# Patient Record
Sex: FEMALE | Race: WHITE | URBAN - METROPOLITAN AREA
[De-identification: names, ages, dates, MRNs, and addresses within clinical notes are randomized per-mention and may not be internally consistent; named-entity substitution may affect disease eponyms.]

---

## 2022-06-23 VITALS — HEIGHT: 65 IN

## 2022-06-28 ENCOUNTER — HOSPITAL ENCOUNTER (OUTPATIENT)
Age: 57
Setting detail: SPECIMEN
Discharge: HOME | End: 2022-06-28

## 2022-06-28 ENCOUNTER — OFFICE VISIT (OUTPATIENT)
Dept: OBGYN CLINIC | Age: 57
End: 2022-06-28
Payer: COMMERCIAL

## 2022-06-28 VITALS — BODY MASS INDEX: 25.63 KG/M2 | WEIGHT: 154 LBS | SYSTOLIC BLOOD PRESSURE: 120 MMHG | DIASTOLIC BLOOD PRESSURE: 74 MMHG

## 2022-06-28 DIAGNOSIS — Z12.31 ENCOUNTER FOR SCREENING MAMMOGRAM FOR MALIGNANT NEOPLASM OF BREAST: ICD-10-CM

## 2022-06-28 DIAGNOSIS — Z01.419 VISIT FOR GYNECOLOGIC EXAMINATION: Primary | ICD-10-CM

## 2022-06-28 PROCEDURE — 99396 PREV VISIT EST AGE 40-64: CPT

## 2022-06-28 RX ORDER — CHOLECALCIFEROL (VITAMIN D3) 1250 MCG
CAPSULE ORAL
COMMUNITY

## 2022-06-28 ASSESSMENT — ENCOUNTER SYMPTOMS
SHORTNESS OF BREATH: 0
ABDOMINAL PAIN: 0
CONSTIPATION: 0
DIARRHEA: 0

## 2022-06-28 NOTE — PROGRESS NOTES
YEARLY PHYSICAL    Date of service: 2022    Martha Hernandez  Is a 64 y.o.   female    PT's PCP is: No primary care provider on file. : 1965                         Subjective:       No LMP recorded. Are your menses regular: not applicable    OB History    Para Term  AB Living   1 1 1     1   SAB IAB Ectopic Molar Multiple Live Births             1      # Outcome Date GA Lbr [place]/2nd Weight Sex Delivery Anes PTL Lv   1 Term 98    M Vag-Forceps EPI  ASAEL        Social History     Tobacco Use   Smoking Status Former Smoker    Types: Cigarettes   Smokeless Tobacco Never Used        Social History     Substance and Sexual Activity   Alcohol Use Yes       Family History   Problem Relation Age of Onset    Lung Cancer Paternal Grandmother     Hypertension Maternal Grandmother     Prostate Cancer Maternal Grandfather     Cancer Father         skin    Cancer Mother         skin    Hypertension Mother     Cancer Brother         skin    Hypertension Brother     Breast Cancer Maternal Aunt        Any family history of breast or ovarian cancer:  Yes    Any family history of blood clots: No    Allergies: Ciprofloxacin and Macrobid [nitrofurantoin]      Current Outpatient Medications:     Multiple Vitamin (MULTI-VITAMIN DAILY PO), Take by mouth, Disp: , Rfl:     Cholecalciferol (VITAMIN D3) 1.25 MG (22966 UT) CAPS, Take by mouth, Disp: , Rfl:     Social History     Substance and Sexual Activity   Sexual Activity Not on file       Any bleeding or pain with intercourse: Yes    Last Yearly:  2020    Last pap: 2020 normal    Last HPV: 2020 negative    Last Mammogram:2019    Last Dexascan n/a    Last colorectal screen- type: colonoscopy date  2019    Do you do self breast exams: Yes    Past Medical History:   Diagnosis Date    Hyperlipidemia        Past Surgical History:   Procedure Laterality Date    HERNIA REPAIR         Family History   Problem Relation Age of Onset    Lung Cancer Paternal Grandmother     Hypertension Maternal Grandmother     Prostate Cancer Maternal Grandfather     Cancer Father         skin    Cancer Mother         skin    Hypertension Mother     Cancer Brother         skin    Hypertension Brother     Breast Cancer Maternal Aunt        Chief Complaint   Patient presents with    Annual Exam     Here for an annual appt, saw someone last year and thinks they may have done a pap last year, received a voicemail from PCP that they have no record of a pap in the last 5 years    Bladder Problem     Thinks her bladder is falling, will have occ DALILA symptoms, urgency, sees urology and thought possibly a UTI and never developed any different symptoms          PE:  Vital Signs  Blood pressure 120/74, weight 154 lb (69.9 kg). Labs:    No results found for this visit on 06/28/22. NURSE: Mikaela Hyde RN    HPI: Patient presents for annual visit. Denies breast concerns. Last mammogram 2019 - will order one for patient. Encouraged SBE. Denies bowel concerns. Reports that she is not leaking urine but feels sensation of increased pressure and urgency to urinate with certain movements. Feels like this has worsened over last couple of months. Made aware on exam that uterine prolapse and cystocele have continued/worsened based on notes from last exam.  Patient states that she sometimes feels anterior wall/uterus at opening of vagina. Both protrude to introitus with bearing down. Discussed management options including supracervical hyst w/ RASC vs vaginal repair. Patient wishes to proceed with intervention now before she starts having urinary leakage. She will return for surgical consult with Dr. Flaca Mcnamara. Subjective:  Review of Systems   Constitutional: Negative for chills, fatigue and fever. Respiratory: Negative for shortness of breath. Cardiovascular: Negative for chest pain.    Gastrointestinal: Negative for abdominal pain, constipation and diarrhea. Genitourinary: Positive for dyspareunia and urgency (urgency/pressure, no leakage). Negative for dysuria, frequency, menstrual problem, pelvic pain, vaginal bleeding and vaginal discharge. Neurological: Negative for dizziness, light-headedness and headaches. Objective  Physical Exam  Constitutional:       Appearance: Normal appearance. She is well-developed. HENT:      Head: Normocephalic and atraumatic. Mouth/Throat:      Mouth: Mucous membranes are moist.   Eyes:      Extraocular Movements: Extraocular movements intact. Cardiovascular:      Rate and Rhythm: Normal rate. Heart sounds: Normal heart sounds. Pulmonary:      Effort: Pulmonary effort is normal.      Breath sounds: Normal breath sounds. Chest:   Breasts: Breasts are symmetrical.      Right: No inverted nipple, mass, nipple discharge, skin change or tenderness. Left: No inverted nipple, mass, nipple discharge, skin change or tenderness. Abdominal:      General: Abdomen is flat. There is no distension. Palpations: Abdomen is soft. There is no mass. Tenderness: There is no abdominal tenderness. There is no guarding or rebound. Genitourinary:     General: Normal vulva. Labia:         Right: No rash, tenderness or lesion. Left: No rash, tenderness or lesion. Vagina: Prolapsed vaginal walls (cystocele, rectocele) present. No vaginal discharge or tenderness. Cervix: Normal.      Uterus: With uterine prolapse. Adnexa:         Right: No tenderness or fullness. Left: No tenderness or fullness. Musculoskeletal:         General: Normal range of motion. Cervical back: Normal range of motion and neck supple. Skin:     General: Skin is warm and dry. Neurological:      General: No focal deficit present. Mental Status: She is alert and oriented to person, place, and time.    Psychiatric:         Behavior: Behavior normal.         Thought Content:

## 2022-06-30 LAB
HPV SAMPLE: NORMAL
HPV, GENOTYPE 16: NOT DETECTED
HPV, GENOTYPE 18: NOT DETECTED
HPV, HIGH RISK OTHER: NOT DETECTED
HPV, INTERPRETATION: NORMAL
SPECIMEN DESCRIPTION: NORMAL

## 2022-07-11 LAB — CYTOLOGY REPORT: NORMAL

## 2022-07-28 ENCOUNTER — OFFICE VISIT (OUTPATIENT)
Dept: OBGYN CLINIC | Age: 57
End: 2022-07-28
Payer: COMMERCIAL

## 2022-07-28 VITALS
WEIGHT: 156 LBS | SYSTOLIC BLOOD PRESSURE: 110 MMHG | BODY MASS INDEX: 25.99 KG/M2 | HEIGHT: 65 IN | DIASTOLIC BLOOD PRESSURE: 70 MMHG

## 2022-07-28 DIAGNOSIS — N81.4 UTEROVAGINAL PROLAPSE: Primary | ICD-10-CM

## 2022-07-28 PROCEDURE — 99213 OFFICE O/P EST LOW 20 MIN: CPT | Performed by: OBSTETRICS & GYNECOLOGY

## 2022-07-28 NOTE — PROGRESS NOTES
DATE OF VISIT:  7/28/22    PATIENT NAME:  Ceci Rutherford     YOB: 1965    REASON FOR VISIT:    Chief Complaint   Patient presents with    Consultation     Pt would like to discuss surgery for prolapse. HISTORY OF PRESENT ILLNESS:  Pt with POP and the beginning of urinary symptoms; has some urgency and some DALILA; interested in surgical intervention; disc'd pelvic organ prolapse inc apical, anterior or posterior and related symptoms; disc'd that incontinence is not always a result of prolapse or improved with treating prolapse depending on the type of incontinence      No LMP recorded. Patient is postmenopausal.  Vitals:    07/28/22 1331   BP: 110/70   Site: Right Upper Arm   Position: Sitting   Weight: 156 lb (70.8 kg)   Height: 5' 5\" (1.651 m)     Body mass index is 25.96 kg/m². Allergies   Allergen Reactions    Ciprofloxacin     Macrobid [Nitrofurantoin]      Current Outpatient Medications   Medication Sig Dispense Refill    Multiple Vitamin (MULTI-VITAMIN DAILY PO) Take by mouth      Cholecalciferol (VITAMIN D3) 1.25 MG (00275 UT) CAPS Take by mouth       No current facility-administered medications for this visit. Social History     Socioeconomic History    Marital status:      Spouse name: None    Number of children: None    Years of education: None    Highest education level: None   Tobacco Use    Smoking status: Former     Types: Cigarettes    Smokeless tobacco: Never   Vaping Use    Vaping Use: Never used   Substance and Sexual Activity    Alcohol use: Yes    Drug use: Never       REVIEW OF SYSTEMS:  Review of Systems   Constitutional:  Negative for chills and fever. Genitourinary:  Positive for dyspareunia, urgency and vaginal pain (prolapse). Negative for dysuria and pelvic pain.      PHYSICAL EXAM:  /70 (Site: Right Upper Arm, Position: Sitting)   Ht 5' 5\" (1.651 m)   Wt 156 lb (70.8 kg)   BMI 25.96 kg/m²   Physical Exam  Constitutional:       Appearance: Normal appearance. Genitourinary:      Vulva normal.      Anterior, posterior and apical vaginal prolapse present. Mild vaginal atrophy present. No cervical discharge or lesion. Uterus is not enlarged. HENT:      Head: Normocephalic and atraumatic. Eyes:      Extraocular Movements: Extraocular movements intact. Pupils: Pupils are equal, round, and reactive to light. Cardiovascular:      Rate and Rhythm: Normal rate. Pulmonary:      Effort: Pulmonary effort is normal.   Musculoskeletal:         General: Normal range of motion. Cervical back: Normal range of motion. Neurological:      Mental Status: She is alert and oriented to person, place, and time. Skin:     General: Skin is warm and dry. Psychiatric:         Mood and Affect: Mood normal.         Behavior: Behavior normal.         Thought Content: Thought content normal.     The patient, Mina White is a 64 y.o. female, was seen with a total time spent of 20 minutes for the visit on this date of service by the E/M provider. The time component had both face to face and non face to face time spent in determining the total time component. Counseling and education regarding her diagnosis listed below and her options regarding those diagnoses were also included in determining her time component. Diagnosis Orders   1. Uterovaginal prolapse             The patient had her preventative health maintenance recommendations and follow-up reviewed with her at the completion of her visit. ASSESSMENT:      1. Uterovaginal prolapse        PLAN:  No orders of the defined types were placed in this encounter. Return for  to coordinate.        Electronically signed by Enrrique Waters DO on 08/28/22

## 2022-08-09 ENCOUNTER — TELEPHONE (OUTPATIENT)
Dept: OBGYN CLINIC | Age: 57
End: 2022-08-09

## 2022-08-09 DIAGNOSIS — N81.4 UTEROVAGINAL PROLAPSE: ICD-10-CM

## 2022-08-09 DIAGNOSIS — Z01.818 PRE-OP TESTING: Primary | ICD-10-CM

## 2022-08-09 NOTE — TELEPHONE ENCOUNTER
You had sent a note that patient was considering a Posterior Repair, but had the option of a LSH/RASC. When I called patient, she seems confused about the options and which one would be a better fit for her. Dena's note says that she has uterine prolapse and cystocele. Your note is not completed, but you said she needed a Posterior Repair. She is wanting to confirm what needs to be done and hoping to get everything fixed at one time. Can you please review and give recommendations?

## 2022-09-07 NOTE — TELEPHONE ENCOUNTER
I spoke to patient and reviewed surgery expectations and recovery. She is scheduled for a RA Laparoscopic Supracervical Hysterectomy/ BSO, Robotic Abdominal Sacral Colpopexy with a Posterior Repair at [facility] on 12/19/2022. She is aware that a nurse from [facility] will call her to complete a phone interview and arrange COVID testing if needed. She has a repetitive job with lifting/pushing/pulling. She is aware that she will need to be off work for 6 weeks. Patient will let me know if she is needing a note for work or [employer] completed. Patient will come in to see Dr. Mary Benitez prior to surgery and will get labs at that visit. Patient will go to [facility] to have an EKG done prior to surgery. We will also follow up 2 and 6 weeks after surgery. Appointments scheduled. Patient verbalized understanding, no further questions/concerns voiced.

## 2022-09-22 ENCOUNTER — TELEPHONE (OUTPATIENT)
Dept: OBGYN CLINIC | Age: 57
End: 2022-09-22

## 2022-11-30 ENCOUNTER — TELEPHONE (OUTPATIENT)
Dept: OBGYN CLINIC | Age: 57
End: 2022-11-30

## 2022-11-30 NOTE — TELEPHONE ENCOUNTER
Patient called questioning how soon after surgery she can have a routine dental cleaning. She was scheduled the day after surgery and will be rescheduling, but wanted to see if there were any restrictions. Informed patient that she should give it a few days so she is a little more comfortable after surgery, but otherwise no restrictions. Patient verbalized understanding, no further questions/concerns voiced.

## 2022-12-15 ENCOUNTER — TELEPHONE (OUTPATIENT)
Dept: OBGYN CLINIC | Age: 57
End: 2022-12-15

## 2022-12-15 NOTE — TELEPHONE ENCOUNTER
Patient called on 12/14 that she was scheduled for surgery on 12/19 with Dr. Brandon Villatoro, but tested positive for COVID on 12/9. Per anesthesia, she needs to be postponed until 28 days after her positive test since she was experiencing symptoms. I had spoke to patient and we penciled her back in for 1/9/23. She had mentioned that she received a letter from her insurance that they were still in negotiations with [facility] ([facility]) about coverage and we may be out of network starting January 2023. I did confirm with Aryan Hubbard that it is strongly recommended that surgery be postponed until we confirm insurance coverage to avoid patient paying the entire bill out of pocket. I did leave all of this on patient's voicemail. Surgery and associated visits cancelled. I will let her know once I can confirm insurance coverage. She is aware that we could attempt to schedule her surgery at [facility] if [facility]ott does not get in network with [facility]. Patient to call with any further questions/concerns.

## 2023-01-18 ENCOUNTER — TELEPHONE (OUTPATIENT)
Dept: OBGYN CLINIC | Age: 58
End: 2023-01-18

## 2023-01-18 NOTE — TELEPHONE ENCOUNTER
PA initiated on [employer]'s automated system regarding patient's upcoming procedure ([address], [address] [place] [address], [facility]). Patient's procedure does not require a PA. Reference # is [id number], [id number], and [id number] respectively.

## 2023-01-24 NOTE — PROGRESS NOTES
Patient instructed per phone interview on the pre-operative, intra-operative, and post-operative process, as well as NPO status. Pre-operative instruction sheet reviewed as well as G skin prep instructions. Verbalizes understanding.

## 2023-02-01 ENCOUNTER — OFFICE VISIT (OUTPATIENT)
Dept: OBGYN CLINIC | Age: 58
End: 2023-02-01
Payer: COMMERCIAL

## 2023-02-01 ENCOUNTER — HOSPITAL ENCOUNTER (OUTPATIENT)
Age: 58
Setting detail: SPECIMEN
Discharge: HOME | End: 2023-02-01

## 2023-02-01 VITALS — WEIGHT: 163 LBS | SYSTOLIC BLOOD PRESSURE: 112 MMHG | DIASTOLIC BLOOD PRESSURE: 74 MMHG | BODY MASS INDEX: 27.12 KG/M2

## 2023-02-01 DIAGNOSIS — N81.4 UTEROVAGINAL PROLAPSE: ICD-10-CM

## 2023-02-01 DIAGNOSIS — Z01.818 PRE-OP TESTING: ICD-10-CM

## 2023-02-01 DIAGNOSIS — N81.4 UTEROVAGINAL PROLAPSE: Primary | ICD-10-CM

## 2023-02-01 LAB
ABSOLUTE EOS #: 0.16 K/UL (ref 0–0.44)
ABSOLUTE IMMATURE GRANULOCYTE: <0.03 K/UL (ref 0–0.3)
ABSOLUTE LYMPH #: 1.47 K/UL (ref 1.1–3.7)
ABSOLUTE MONO #: 0.3 K/UL (ref 0.1–1.2)
ALBUMIN SERPL-MCNC: 4.5 G/DL (ref 3.5–5.2)
ALBUMIN/GLOBULIN RATIO: 1.7 (ref 1–2.5)
ALP SERPL-CCNC: 63 U/L (ref 35–104)
ALT SERPL-CCNC: 14 U/L (ref 5–33)
ANION GAP SERPL CALCULATED.3IONS-SCNC: 10 MMOL/L (ref 9–17)
AST SERPL-CCNC: 17 U/L
BASOPHILS # BLD: 2 % (ref 0–2)
BASOPHILS ABSOLUTE: 0.08 K/UL (ref 0–0.2)
BILIRUB SERPL-MCNC: 0.3 MG/DL (ref 0.3–1.2)
BUN SERPL-MCNC: 14 MG/DL (ref 6–20)
CALCIUM SERPL-MCNC: 9.7 MG/DL (ref 8.6–10.4)
CHLORIDE SERPL-SCNC: 104 MMOL/L (ref 98–107)
CO2 SERPL-SCNC: 29 MMOL/L (ref 20–31)
CREAT SERPL-MCNC: 0.67 MG/DL (ref 0.5–0.9)
EOSINOPHILS RELATIVE PERCENT: 3 % (ref 1–4)
GFR SERPL CREATININE-BSD FRML MDRD: >60 ML/MIN/1.73M2
GLUCOSE SERPL-MCNC: 84 MG/DL (ref 70–99)
HCT VFR BLD AUTO: 43.1 % (ref 36.3–47.1)
HGB BLD-MCNC: 13.9 G/DL (ref 11.9–15.1)
IMMATURE GRANULOCYTES: 0 %
LYMPHOCYTES # BLD: 28 % (ref 24–43)
MCH RBC QN AUTO: 30.6 PG (ref 25.2–33.5)
MCHC RBC AUTO-ENTMCNC: 32.3 G/DL (ref 28.4–34.8)
MCV RBC AUTO: 94.9 FL (ref 82.6–102.9)
MONOCYTES # BLD: 6 % (ref 3–12)
NRBC AUTOMATED: 0 PER 100 WBC
PDW BLD-RTO: 13.7 % (ref 11.8–14.4)
PLATELET # BLD AUTO: 197 K/UL (ref 138–453)
PMV BLD AUTO: 11.7 FL (ref 8.1–13.5)
POTASSIUM SERPL-SCNC: 4.5 MMOL/L (ref 3.7–5.3)
PROT SERPL-MCNC: 7.2 G/DL (ref 6.4–8.3)
RBC # BLD: 4.54 M/UL (ref 3.95–5.11)
SEG NEUTROPHILS: 61 % (ref 36–65)
SEGMENTED NEUTROPHILS ABSOLUTE COUNT: 3.15 K/UL (ref 1.5–8.1)
SODIUM SERPL-SCNC: 143 MMOL/L (ref 135–144)
WBC # BLD AUTO: 5.2 K/UL (ref 3.5–11.3)

## 2023-02-01 PROCEDURE — 99213 OFFICE O/P EST LOW 20 MIN: CPT | Performed by: OBSTETRICS & GYNECOLOGY

## 2023-02-01 NOTE — PROGRESS NOTES
DATE OF VISIT:  23    PATIENT NAME:  Lesley Jackson     YOB: 1965    REASON FOR VISIT:    Chief Complaint   Patient presents with    Pre-op Exam     Patient is scheduled on 2023 for RA LSH/BSO, RASC, posterior repair. Other     Rectocele,cystocele. Has stress urinary incontinence and urgency. Patient can feel a bulge which is worse if she has done some lifting. HISTORY OF PRESENT ILLNESS:  Pt with uterovaginal prolapse and interested in surgical intervention; disc'd ra lsh/rasc and posterior repair; r/b/a disc'd; restrictions and recovery disc'd      No LMP recorded. Patient is postmenopausal.  Vitals:    23 0932   BP: 112/74   Site: Right Upper Arm   Position: Sitting   Weight: 163 lb (73.9 kg)     Body mass index is 27.12 kg/m². Allergies   Allergen Reactions    Ciprofloxacin     Macrobid [Nitrofurantoin]      Current Outpatient Medications   Medication Sig Dispense Refill    Multiple Vitamin (MULTI-VITAMIN DAILY PO) Take by mouth (Patient not taking: Reported on 2023)      Cholecalciferol (VITAMIN D3) 1.25 MG (50694 UT) CAPS Take by mouth (Patient not taking: Reported on 2023)       No current facility-administered medications for this visit. Social History     Socioeconomic History    Marital status:      Spouse name: None    Number of children: None    Years of education: None    Highest education level: None   Tobacco Use    Smoking status: Former     Types: Cigarettes     Quit date:      Years since quittin.1    Smokeless tobacco: Never   Vaping Use    Vaping Use: Never used   Substance and Sexual Activity    Alcohol use: Yes    Drug use: Never       REVIEW OF SYSTEMS:  Review of Systems   Constitutional:  Negative for chills and fever. Genitourinary:  Positive for vaginal pain (bulge). Negative for dysuria, pelvic pain, urgency, vaginal bleeding and vaginal discharge.      PHYSICAL EXAM:  /74 (Site: Right Upper Arm, Position: Sitting)   Wt 163 lb (73.9 kg)   BMI 27.12 kg/m²   Physical Exam  Constitutional:       Appearance: Normal appearance. HENT:      Head: Normocephalic and atraumatic. Eyes:      Extraocular Movements: Extraocular movements intact. Pupils: Pupils are equal, round, and reactive to light. Cardiovascular:      Rate and Rhythm: Normal rate. Pulmonary:      Effort: Pulmonary effort is normal.   Musculoskeletal:         General: Normal range of motion. Neurological:      Mental Status: She is alert and oriented to person, place, and time. Skin:     General: Skin is warm and dry. Psychiatric:         Mood and Affect: Mood normal.         Behavior: Behavior normal.   The patient, Trey Capellan is a 62 y.o. female, was seen with a total time spent of 20 minutes for the visit on this date of service by the E/M provider. The time component had both face to face and non face to face time spent in determining the total time component. Counseling and education regarding her diagnosis listed below and her options regarding those diagnoses were also included in determining her time component. Diagnosis Orders   1. Uterovaginal prolapse             The patient had her preventative health maintenance recommendations and follow-up reviewed with her at the completion of her visit. ASSESSMENT:      1. Uterovaginal prolapse        PLAN:  No orders of the defined types were placed in this encounter. Return in about 1 week (around 2/8/2023) for RA LSH/BSO; RASC/post colporrhaphy.        Electronically signed by Cesar Kamara DO on 02/01/23

## 2023-02-03 ENCOUNTER — ANESTHESIA EVENT (OUTPATIENT)
Dept: OPERATING ROOM | Age: 58
End: 2023-02-03
Payer: COMMERCIAL

## 2023-02-06 ENCOUNTER — HOSPITAL ENCOUNTER (OUTPATIENT)
Age: 58
Setting detail: OUTPATIENT SURGERY
Discharge: HOME | End: 2023-02-06
Attending: OBSTETRICS & GYNECOLOGY | Admitting: OBSTETRICS & GYNECOLOGY
Payer: COMMERCIAL

## 2023-02-06 ENCOUNTER — ANESTHESIA (OUTPATIENT)
Dept: OPERATING ROOM | Age: 58
End: 2023-02-06
Payer: COMMERCIAL

## 2023-02-06 VITALS
RESPIRATION RATE: 18 BRPM | WEIGHT: 160 LBS | HEART RATE: 60 BPM | TEMPERATURE: 97.6 F | OXYGEN SATURATION: 97 % | HEIGHT: 65 IN | SYSTOLIC BLOOD PRESSURE: 110 MMHG | BODY MASS INDEX: 26.66 KG/M2 | DIASTOLIC BLOOD PRESSURE: 55 MMHG

## 2023-02-06 DIAGNOSIS — N81.4 UTEROVAGINAL PROLAPSE: ICD-10-CM

## 2023-02-06 DIAGNOSIS — G89.18 POST-OP PAIN: Primary | ICD-10-CM

## 2023-02-06 PROCEDURE — 6360000002 HC RX W HCPCS: Performed by: NURSE ANESTHETIST, CERTIFIED REGISTERED

## 2023-02-06 PROCEDURE — S2900 ROBOTIC SURGICAL SYSTEM: HCPCS | Performed by: OBSTETRICS & GYNECOLOGY

## 2023-02-06 PROCEDURE — 58542 LSH W/T/O UT 250 G OR LESS: CPT | Performed by: OBSTETRICS & GYNECOLOGY

## 2023-02-06 PROCEDURE — 6360000002 HC RX W HCPCS

## 2023-02-06 PROCEDURE — 7100000011 HC PHASE II RECOVERY - ADDTL 15 MIN: Performed by: OBSTETRICS & GYNECOLOGY

## 2023-02-06 PROCEDURE — 2580000003 HC RX 258: Performed by: NURSE ANESTHETIST, CERTIFIED REGISTERED

## 2023-02-06 PROCEDURE — 3600000009 HC SURGERY ROBOT BASE: Performed by: OBSTETRICS & GYNECOLOGY

## 2023-02-06 PROCEDURE — A4216 STERILE WATER/SALINE, 10 ML: HCPCS | Performed by: NURSE ANESTHETIST, CERTIFIED REGISTERED

## 2023-02-06 PROCEDURE — 3700000000 HC ANESTHESIA ATTENDED CARE: Performed by: OBSTETRICS & GYNECOLOGY

## 2023-02-06 PROCEDURE — 64488 TAP BLOCK BI INJECTION: CPT | Performed by: NURSE ANESTHETIST, CERTIFIED REGISTERED

## 2023-02-06 PROCEDURE — 7100000000 HC PACU RECOVERY - FIRST 15 MIN: Performed by: OBSTETRICS & GYNECOLOGY

## 2023-02-06 PROCEDURE — 3600000019 HC SURGERY ROBOT ADDTL 15MIN: Performed by: OBSTETRICS & GYNECOLOGY

## 2023-02-06 PROCEDURE — 88305 TISSUE EXAM BY PATHOLOGIST: CPT

## 2023-02-06 PROCEDURE — 2500000003 HC RX 250 WO HCPCS: Performed by: OBSTETRICS & GYNECOLOGY

## 2023-02-06 PROCEDURE — 57425 LAPAROSCOPY SURG COLPOPEXY: CPT | Performed by: OBSTETRICS & GYNECOLOGY

## 2023-02-06 PROCEDURE — 6370000000 HC RX 637 (ALT 250 FOR IP): Performed by: NURSE ANESTHETIST, CERTIFIED REGISTERED

## 2023-02-06 PROCEDURE — 57250 REPAIR RECTUM & VAGINA: CPT | Performed by: OBSTETRICS & GYNECOLOGY

## 2023-02-06 PROCEDURE — 7100000001 HC PACU RECOVERY - ADDTL 15 MIN: Performed by: OBSTETRICS & GYNECOLOGY

## 2023-02-06 PROCEDURE — 2500000003 HC RX 250 WO HCPCS: Performed by: NURSE ANESTHETIST, CERTIFIED REGISTERED

## 2023-02-06 PROCEDURE — 3700000001 HC ADD 15 MINUTES (ANESTHESIA): Performed by: OBSTETRICS & GYNECOLOGY

## 2023-02-06 PROCEDURE — C1781 MESH (IMPLANTABLE): HCPCS | Performed by: OBSTETRICS & GYNECOLOGY

## 2023-02-06 PROCEDURE — 7100000010 HC PHASE II RECOVERY - FIRST 15 MIN: Performed by: OBSTETRICS & GYNECOLOGY

## 2023-02-06 PROCEDURE — 2709999900 HC NON-CHARGEABLE SUPPLY: Performed by: OBSTETRICS & GYNECOLOGY

## 2023-02-06 DEVICE — IMPLANTABLE DEVICE: Type: IMPLANTABLE DEVICE | Site: CERVIX | Status: FUNCTIONAL

## 2023-02-06 RX ORDER — FENTANYL CITRATE 50 UG/ML
50 INJECTION, SOLUTION INTRAMUSCULAR; INTRAVENOUS EVERY 5 MIN PRN
Status: DISCONTINUED | OUTPATIENT
Start: 2023-02-06 | End: 2023-02-06 | Stop reason: HOSPADM

## 2023-02-06 RX ORDER — PHENAZOPYRIDINE HYDROCHLORIDE 100 MG/1
200 TABLET, FILM COATED ORAL ONCE
Status: COMPLETED | OUTPATIENT
Start: 2023-02-06 | End: 2023-02-06

## 2023-02-06 RX ORDER — DEXAMETHASONE SODIUM PHOSPHATE 10 MG/ML
INJECTION, SOLUTION INTRAMUSCULAR; INTRAVENOUS PRN
Status: DISCONTINUED | OUTPATIENT
Start: 2023-02-06 | End: 2023-02-06 | Stop reason: SDUPTHER

## 2023-02-06 RX ORDER — KETOROLAC TROMETHAMINE 30 MG/ML
INJECTION, SOLUTION INTRAMUSCULAR; INTRAVENOUS PRN
Status: DISCONTINUED | OUTPATIENT
Start: 2023-02-06 | End: 2023-02-06 | Stop reason: SDUPTHER

## 2023-02-06 RX ORDER — MIDAZOLAM HYDROCHLORIDE 1 MG/ML
INJECTION INTRAMUSCULAR; INTRAVENOUS PRN
Status: DISCONTINUED | OUTPATIENT
Start: 2023-02-06 | End: 2023-02-06 | Stop reason: SDUPTHER

## 2023-02-06 RX ORDER — ENOXAPARIN SODIUM 100 MG/ML
40 INJECTION SUBCUTANEOUS ONCE
Status: COMPLETED | OUTPATIENT
Start: 2023-02-06 | End: 2023-02-06

## 2023-02-06 RX ORDER — ACETAMINOPHEN 325 MG/1
650 TABLET ORAL ONCE
Status: COMPLETED | OUTPATIENT
Start: 2023-02-06 | End: 2023-02-06

## 2023-02-06 RX ORDER — FENTANYL CITRATE 50 UG/ML
INJECTION, SOLUTION INTRAMUSCULAR; INTRAVENOUS PRN
Status: DISCONTINUED | OUTPATIENT
Start: 2023-02-06 | End: 2023-02-06 | Stop reason: SDUPTHER

## 2023-02-06 RX ORDER — FENTANYL CITRATE 50 UG/ML
25 INJECTION, SOLUTION INTRAMUSCULAR; INTRAVENOUS EVERY 5 MIN PRN
Status: DISCONTINUED | OUTPATIENT
Start: 2023-02-06 | End: 2023-02-06 | Stop reason: HOSPADM

## 2023-02-06 RX ORDER — LIDOCAINE HYDROCHLORIDE 20 MG/ML
INJECTION, SOLUTION EPIDURAL; INFILTRATION; INTRACAUDAL; PERINEURAL PRN
Status: DISCONTINUED | OUTPATIENT
Start: 2023-02-06 | End: 2023-02-06 | Stop reason: SDUPTHER

## 2023-02-06 RX ORDER — SODIUM CHLORIDE 9 MG/ML
INJECTION, SOLUTION INTRAVENOUS PRN
Status: DISCONTINUED | OUTPATIENT
Start: 2023-02-06 | End: 2023-02-06 | Stop reason: HOSPADM

## 2023-02-06 RX ORDER — ONDANSETRON 2 MG/ML
INJECTION INTRAMUSCULAR; INTRAVENOUS PRN
Status: DISCONTINUED | OUTPATIENT
Start: 2023-02-06 | End: 2023-02-06 | Stop reason: SDUPTHER

## 2023-02-06 RX ORDER — DEXAMETHASONE SODIUM PHOSPHATE 4 MG/ML
INJECTION, SOLUTION INTRA-ARTICULAR; INTRALESIONAL; INTRAMUSCULAR; INTRAVENOUS; SOFT TISSUE PRN
Status: DISCONTINUED | OUTPATIENT
Start: 2023-02-06 | End: 2023-02-06 | Stop reason: SDUPTHER

## 2023-02-06 RX ORDER — KETOROLAC TROMETHAMINE 10 MG/1
10 TABLET, FILM COATED ORAL EVERY 6 HOURS PRN
Qty: 20 TABLET | Refills: 0 | Status: SHIPPED | OUTPATIENT
Start: 2023-02-06 | End: 2024-02-06

## 2023-02-06 RX ORDER — LABETALOL HYDROCHLORIDE 5 MG/ML
INJECTION, SOLUTION INTRAVENOUS PRN
Status: DISCONTINUED | OUTPATIENT
Start: 2023-02-06 | End: 2023-02-06 | Stop reason: SDUPTHER

## 2023-02-06 RX ORDER — HYDROCODONE BITARTRATE AND ACETAMINOPHEN 5; 325 MG/1; MG/1
1 TABLET ORAL EVERY 6 HOURS PRN
Qty: 10 TABLET | Refills: 0 | Status: SHIPPED | OUTPATIENT
Start: 2023-02-06 | End: 2023-02-11

## 2023-02-06 RX ORDER — DIMENHYDRINATE 50 MG/1
50 TABLET ORAL ONCE
Status: COMPLETED | OUTPATIENT
Start: 2023-02-06 | End: 2023-02-06

## 2023-02-06 RX ORDER — SODIUM CHLORIDE 0.9 % (FLUSH) 0.9 %
5-40 SYRINGE (ML) INJECTION PRN
Status: DISCONTINUED | OUTPATIENT
Start: 2023-02-06 | End: 2023-02-06 | Stop reason: HOSPADM

## 2023-02-06 RX ORDER — CEFAZOLIN SODIUM IN 0.9 % NACL 2 G/100 ML
2000 PLASTIC BAG, INJECTION (ML) INTRAVENOUS
Status: COMPLETED | OUTPATIENT
Start: 2023-02-06 | End: 2023-02-06

## 2023-02-06 RX ORDER — ROPIVACAINE HYDROCHLORIDE 5 MG/ML
INJECTION, SOLUTION EPIDURAL; INFILTRATION; PERINEURAL PRN
Status: DISCONTINUED | OUTPATIENT
Start: 2023-02-06 | End: 2023-02-06 | Stop reason: SDUPTHER

## 2023-02-06 RX ORDER — SODIUM CHLORIDE, SODIUM LACTATE, POTASSIUM CHLORIDE, CALCIUM CHLORIDE 600; 310; 30; 20 MG/100ML; MG/100ML; MG/100ML; MG/100ML
INJECTION, SOLUTION INTRAVENOUS CONTINUOUS
Status: DISCONTINUED | OUTPATIENT
Start: 2023-02-06 | End: 2023-02-06 | Stop reason: HOSPADM

## 2023-02-06 RX ORDER — OXYCODONE HYDROCHLORIDE 5 MG/1
5 TABLET ORAL PRN
Status: COMPLETED | OUTPATIENT
Start: 2023-02-06 | End: 2023-02-06

## 2023-02-06 RX ORDER — PROPOFOL 10 MG/ML
INJECTION, EMULSION INTRAVENOUS PRN
Status: DISCONTINUED | OUTPATIENT
Start: 2023-02-06 | End: 2023-02-06 | Stop reason: SDUPTHER

## 2023-02-06 RX ORDER — PROCHLORPERAZINE EDISYLATE 5 MG/ML
5 INJECTION INTRAMUSCULAR; INTRAVENOUS
Status: DISCONTINUED | OUTPATIENT
Start: 2023-02-06 | End: 2023-02-06 | Stop reason: HOSPADM

## 2023-02-06 RX ORDER — ROCURONIUM BROMIDE 10 MG/ML
INJECTION, SOLUTION INTRAVENOUS PRN
Status: DISCONTINUED | OUTPATIENT
Start: 2023-02-06 | End: 2023-02-06 | Stop reason: SDUPTHER

## 2023-02-06 RX ORDER — OXYCODONE HYDROCHLORIDE 5 MG/1
10 TABLET ORAL PRN
Status: COMPLETED | OUTPATIENT
Start: 2023-02-06 | End: 2023-02-06

## 2023-02-06 RX ORDER — ONDANSETRON 2 MG/ML
4 INJECTION INTRAMUSCULAR; INTRAVENOUS
Status: DISCONTINUED | OUTPATIENT
Start: 2023-02-06 | End: 2023-02-06 | Stop reason: HOSPADM

## 2023-02-06 RX ORDER — LIDOCAINE HYDROCHLORIDE 10 MG/ML
INJECTION, SOLUTION INFILTRATION; PERINEURAL PRN
Status: DISCONTINUED | OUTPATIENT
Start: 2023-02-06 | End: 2023-02-06 | Stop reason: ALTCHOICE

## 2023-02-06 RX ORDER — SODIUM CHLORIDE 0.9 % (FLUSH) 0.9 %
5-40 SYRINGE (ML) INJECTION EVERY 12 HOURS SCHEDULED
Status: DISCONTINUED | OUTPATIENT
Start: 2023-02-06 | End: 2023-02-06 | Stop reason: HOSPADM

## 2023-02-06 RX ORDER — SODIUM CHLORIDE 9 MG/ML
INJECTION INTRAVENOUS PRN
Status: DISCONTINUED | OUTPATIENT
Start: 2023-02-06 | End: 2023-02-06 | Stop reason: SDUPTHER

## 2023-02-06 RX ADMIN — MIDAZOLAM 2 MG: 1 INJECTION INTRAMUSCULAR; INTRAVENOUS at 07:19

## 2023-02-06 RX ADMIN — SODIUM CHLORIDE 26 ML: 9 INJECTION, SOLUTION INTRAMUSCULAR; INTRAVENOUS; SUBCUTANEOUS at 07:25

## 2023-02-06 RX ADMIN — FENTANYL CITRATE 25 MCG: 50 INJECTION INTRAMUSCULAR; INTRAVENOUS at 09:58

## 2023-02-06 RX ADMIN — KETOROLAC TROMETHAMINE 30 MG: 30 INJECTION, SOLUTION INTRAMUSCULAR; INTRAVENOUS at 09:55

## 2023-02-06 RX ADMIN — FENTANYL CITRATE 50 MCG: 50 INJECTION INTRAMUSCULAR; INTRAVENOUS at 07:39

## 2023-02-06 RX ADMIN — Medication 2000 MG: at 07:34

## 2023-02-06 RX ADMIN — ONDANSETRON 4 MG: 2 INJECTION INTRAMUSCULAR; INTRAVENOUS at 08:32

## 2023-02-06 RX ADMIN — PROPOFOL 40 MG: 10 INJECTION, EMULSION INTRAVENOUS at 09:58

## 2023-02-06 RX ADMIN — FENTANYL CITRATE 25 MCG: 50 INJECTION, SOLUTION INTRAMUSCULAR; INTRAVENOUS at 11:21

## 2023-02-06 RX ADMIN — LIDOCAINE HYDROCHLORIDE 60 MG: 20 INJECTION, SOLUTION EPIDURAL; INFILTRATION; INTRACAUDAL; PERINEURAL at 07:42

## 2023-02-06 RX ADMIN — ROCURONIUM BROMIDE 40 MG: 10 INJECTION, SOLUTION INTRAVENOUS at 07:42

## 2023-02-06 RX ADMIN — LABETALOL HYDROCHLORIDE 10 MG: 5 INJECTION INTRAVENOUS at 10:22

## 2023-02-06 RX ADMIN — ROCURONIUM BROMIDE 10 MG: 10 INJECTION, SOLUTION INTRAVENOUS at 08:04

## 2023-02-06 RX ADMIN — PHENAZOPYRIDINE HYDROCHLORIDE 200 MG: 100 TABLET ORAL at 11:48

## 2023-02-06 RX ADMIN — DEXAMETHASONE SODIUM PHOSPHATE 4 MG: 4 INJECTION, SOLUTION INTRAMUSCULAR; INTRAVENOUS at 08:32

## 2023-02-06 RX ADMIN — PROPOFOL 160 MG: 10 INJECTION, EMULSION INTRAVENOUS at 07:42

## 2023-02-06 RX ADMIN — FENTANYL CITRATE 25 MCG: 50 INJECTION INTRAMUSCULAR; INTRAVENOUS at 08:09

## 2023-02-06 RX ADMIN — SODIUM CHLORIDE, POTASSIUM CHLORIDE, SODIUM LACTATE AND CALCIUM CHLORIDE: 600; 310; 30; 20 INJECTION, SOLUTION INTRAVENOUS at 06:56

## 2023-02-06 RX ADMIN — LABETALOL HYDROCHLORIDE 5 MG: 5 INJECTION INTRAVENOUS at 08:37

## 2023-02-06 RX ADMIN — DEXAMETHASONE SODIUM PHOSPHATE 20 MG: 10 INJECTION INTRAMUSCULAR; INTRAVENOUS at 07:25

## 2023-02-06 RX ADMIN — OXYCODONE HYDROCHLORIDE 5 MG: 5 TABLET ORAL at 12:00

## 2023-02-06 RX ADMIN — DIMENHYDRINATE 50 MG: 50 TABLET ORAL at 06:50

## 2023-02-06 RX ADMIN — LABETALOL HYDROCHLORIDE 5 MG: 5 INJECTION INTRAVENOUS at 08:31

## 2023-02-06 RX ADMIN — ENOXAPARIN SODIUM 40 MG: 100 INJECTION SUBCUTANEOUS at 06:49

## 2023-02-06 RX ADMIN — ROPIVACAINE HYDROCHLORIDE 44 ML: 5 INJECTION EPIDURAL; INFILTRATION; PERINEURAL at 07:25

## 2023-02-06 RX ADMIN — ACETAMINOPHEN 650 MG: 325 TABLET ORAL at 06:50

## 2023-02-06 ASSESSMENT — PAIN DESCRIPTION - LOCATION
LOCATION: ABDOMEN;VAGINA
LOCATION: VAGINA
LOCATION: ABDOMEN;VAGINA
LOCATION: ABDOMEN
LOCATION: VAGINA
LOCATION: VAGINA

## 2023-02-06 ASSESSMENT — PAIN DESCRIPTION - PAIN TYPE
TYPE: SURGICAL PAIN

## 2023-02-06 ASSESSMENT — PAIN SCALES - GENERAL
PAINLEVEL_OUTOF10: 4
PAINLEVEL_OUTOF10: 4
PAINLEVEL_OUTOF10: 0
PAINLEVEL_OUTOF10: 4
PAINLEVEL_OUTOF10: 5
PAINLEVEL_OUTOF10: 6
PAINLEVEL_OUTOF10: 3

## 2023-02-06 ASSESSMENT — PAIN DESCRIPTION - DESCRIPTORS
DESCRIPTORS: BURNING;CRAMPING
DESCRIPTORS: CRAMPING;BURNING
DESCRIPTORS: BURNING
DESCRIPTORS: BURNING
DESCRIPTORS: CRAMPING
DESCRIPTORS: BURNING

## 2023-02-06 ASSESSMENT — PAIN DESCRIPTION - FREQUENCY
FREQUENCY: CONTINUOUS

## 2023-02-06 NOTE — ANESTHESIA POSTPROCEDURE EVALUATION
Department of Anesthesiology  Postprocedure Note    Patient: Quan Amaya  MRN: [id number]  YOB: 1965  Date of evaluation: 2/6/2023      Procedure Summary     Date: 02/06/23 Room / Location: [address] [place] [address]    Anesthesia Start: 9836 Anesthesia Stop: 0019    Procedures:       HYSTERECTOMY VAGINAL LAPAROSCOPIC ROBOTIC ASSISTED-  SUPRACERVICAL,  BSO (Abdomen)      SACRAL COLPOPEXY ROBOTIC-ABDOMINAL (Abdomen)      VAGINAL REPAIR ANTERIOR AND POSTERIOR-POSTERIOR ONLY (Vagina ) Diagnosis:       Uterovaginal prolapse      (UTEROVAGINAL PROLAPSE)    Surgeons: Ximena Marley DO Responsible Provider: ALEX Chung - RUCHI    Anesthesia Type: general ASA Status: 1          Anesthesia Type: No value filed.     Angelique Phase I: Angelique Score: 10    Angelique Phase II: Angelique Score: 10      Anesthesia Post Evaluation    Patient location during evaluation: PACU  Patient participation: complete - patient participated  Level of consciousness: awake and alert  Airway patency: patent  Nausea & Vomiting: no nausea and no vomiting  Complications: no  Cardiovascular status: blood pressure returned to baseline  Respiratory status: acceptable  Hydration status: euvolemic

## 2023-02-06 NOTE — ANESTHESIA PROCEDURE NOTES
Peripheral Block    Patient location during procedure: holding area  Reason for block: post-op pain management and at surgeon's request  Start time: 2/6/2023 7:19 AM  End time: 2/6/2023 7:25 AM  Staffing  Performed: resident/CRNA   Resident/CRNA: ALEX Virk CRNA  Preanesthetic Checklist  Completed: patient identified, IV checked, site marked, risks and benefits discussed, surgical/procedural consents, equipment checked, pre-op evaluation, timeout performed, anesthesia consent given, oxygen available, monitors applied/VS acknowledged, fire risk safety assessment completed and verbalized and blood product R/B/A discussed and consented  Peripheral Block   Patient position: supine  Prep: ChloraPrep  Provider prep: mask and sterile gloves  Patient monitoring: continuous pulse ox, IV access and responsive to questions (NIBP, EKG, O2 immediately available)  Block type: TAP and Rectus sheath  Laterality: bilateral  Injection technique: single-shot  Guidance: ultrasound guided  Local infiltration: ropivacaine and decadron  Local infiltration: ropivacaine and decadron    Needle   Needle type: short-bevel   Needle gauge: 22 G  Needle localization: ultrasound guidance  Needle length: 8 cm  Assessment   Injection assessment: negative aspiration for heme, no paresthesia on injection and no intravascular symptoms (local spread in facial planes observed)  Paresthesia pain: none  Slow fractionated injection: yes  Hemodynamics: stable  Real-time US image taken/store: yes  Outcomes: uncomplicated and patient tolerated procedure well    Additional Notes  rectus sheath - 6 ml ropivacaine 0.5%+4ml NaCl inj+5mg dexamethasone PF per side  TAP - 16 ml ropivacaine 0.5%+9ml NaCl inj+5mg dexamethasone PF per side

## 2023-02-06 NOTE — OP NOTE
Preoperative diagnosis:   1. Uterovaginal prolapse     Postoperative diagnosis: Same     Procedure:  1. Robotic-assisted Total Laparoscopic Supracervical Hysterectomy with Bilateral Salpingo-oophorectomy  2. Laparoscopic abdominal sacral colopexy with Y mesh  3. Posterior colporrhaphy     Surgeon: Dr. Zhanna Ruano     Asst.: Dee Lara Query PGY2     Anesthesia: Gen. Estimated blood loss: 25 mL     Fluids: LR     Urine: 100 mL of clear urine     Condition: Stable     Complications: None     Findings: The patient had an anteverted uterus which sounded to 7 cm in depth. The uterus and bladder were prolapsed to a grade 3 position. There was also a grade 3 rectocele that was not improved after the RASC. Bilateral ureteral peristalsis was noted throughout the case and after placement of the mesh. Specimen removed: Uterus and Bilateral tubes and ovaries     Operative note: The patient was taken to the operating room where general anesthesia was obtained without difficulty. She was prepped and draped usual sterile fashion in a dorsal lithotomy position. Timeout was performed. A weighted speculum was placed in the patient's vagina and the anterior lip of the cervix was grasped with a single-tooth tenaculum. The cervix was progressively dilated and the uterus was sounded with the findings noted above. A V care uterine manipulator was then placed. A Cain catheter was placed and left to gravity drainage. At this point attention was turned to the patient's abdomen where a supraumbilical incision was made with a scalpel. A veress needle was then carefully introduced at a 45° angle while tenting the abdominal wall. Intraabdominal placement was confirmed by use of water-filled syringe and drop in intra-abdominal pressure with insufflation of CO2. Pneumoperitoneum was obtained to half liters of CO2.   A robotic port was then placed without difficulty and intra-abdominal placement was confirmed by laparoscopy. Second and third ports were then placed under direct visualization approximately 4 cm inferior and  5 cm lateral to the first.  8 mm robotic ports were placed in these locations. A fourth and fifth ports were placed medial to the bilateral anterior superior iliac spines. We placed 8 mm robotic ports in these locations as well. At this point the patient was placed into steep Trendelenburg position. The robot was brought into position and docked. The right arm was loaded with the scissors with monopolar cautery. The left side was loaded with the fenestrated bipolar and an atraumatic bowel grasper was placed in the the fourth arm. Attention was then turned to the console portion of the surgery. Beginning with the left, the tube and ovarian pedicle were tented up and the infundibulopelvic ligament was ligated with the fenestrated bipolar and transected with the scissors. Dissection continued along the broad ligament until the round ligament was ligated and transected. The anterior uterine serosa was then undermined and taken down to free the bladder from the lower uterine segment and cervix. This was done working from the left side to the midline. Attention was then turned to the right cornual region of the uterus and in a similar manner the tube and ovary were tented up and the IP ligament was ligated and transected. Dissection again continued along the broad ligament until the round ligament was ligated and transected. The remainder of the bladder flap was then taken down. Once the bladder was freed from the lower uterine segment and cervix, the uterine arteries were skeletonized, ligated, and transected bilaterally. The uterus was amputated just above the cervix and placed into an endocatch. The sacral promontory was then identified and the bowel was swept to the left of midline with the atraumatic grasper.   The scissors were used to dissect down to the underlying fascia. 2 sutures of 2-0 Ethibond were placed in the sacral promontory fascia. Retroperitoneal dissection from the promontory to the right uterosacral ligament pedicle was then completed with hydro-dissection and blunt dissection. The Y mesh was introduced and the short arms were anchored to the anterior and posterior aspects of the cervix with 4 interrupted sutures of Ethibond suture. The long arm of the mesh was then threaded posterior to the peritoneum. The  EEA sizer was used to elevate the cervix to determine where to affix the long arm of the mesh to the sacral promontory. The previously placed sutures were threaded through the mesh and tied, anchoring the mesh to the sacral promontory. The mesh was trimmed and the extra mesh was handed off through the assist port. The peritoneum was closed with 3-0 V-loc suture. Copious amounts of irrigation were then used to evaluate the cuff and pedicles to assure hemostasis. Bilateral ureteral peristalsis was again noted. At this point the instruments were removed from the abdomen. The uterus was delivered through the assist port after extending the incision to approximately 2 cm. After the uterus was delivered the fascia was closed with a Carry Lange and 0 Vicryl. The skin incisions were closed with 4-0 Monocryl in a subcuticular fashion. The uterus and bilateral tubes and ovaries were handed off to pathology. The Cain catheter was removed. A right angle speculum was placed in the patient's vagina and the posterior vaginal wall was grasped in the midline at the level of the hymen with an Allis. A second Allis was then placed at the [place] of the rectocele. The area between the 2 Allises was injected with 1% lidocaine in 50-50 blend with injectable saline. Approximately 20 mL of this solution was injected to assist with hydrodissection. A scalpel was then used to incise between 2 Allises.   Sharp and blunt dissection were used to dissect down to the underlying fascia. The underlying fascia was then reapproximated with interrupted sutures of 2-0 Vicryl. The perineal body was rebuilt with interrupted sutures of 0 Vicryl. The excess vaginal mucosa was then trimmed with Metzenbaum scissors. The vaginal mucosa was closed with figure of eights of 0 Vicryl. Sponge, lap, needle, and instrument counts were correct ×2. The patient was taken to the recovery area in apparently stable condition. Surgical Assistant documentation:    An assistant surgeon was required with this surgery. She was able to provide the necessary visualization, uterine manipulation, and suturing. Having her available allowed this case to be perfomed in a safe and timely manner.

## 2023-02-06 NOTE — H&P
HISTORY AND PHYSICAL             Date: 2/6/2023        Patient Name: Carlisle Landau     YOB: 1965      Age:  62 y.o. Chief Complaint   No chief complaint on file. History Obtained From   patient    History of Present Illness   Pt with uterovaginal prolapse presents for RA LSH/BSO with RASC and poss posterior repair    Past Medical History     Past Medical History:   Diagnosis Date    Hyperlipidemia         Past Surgical History     Past Surgical History:   Procedure Laterality Date    HERNIA REPAIR  1999        Medications Prior to Admission     Prior to Admission medications    Medication Sig Start Date End Date Taking? Authorizing Provider   Multiple Vitamin (MULTI-VITAMIN DAILY PO) Take by mouth  Patient not taking: No sig reported    Historical Provider, MD   Cholecalciferol (VITAMIN D3) 1.25 MG (49319 UT) CAPS Take by mouth  Patient not taking: No sig reported    Historical Provider, MD        Allergies   Ciprofloxacin and Macrobid [nitrofurantoin]    Social History     Social History       Tobacco History       Smoking Status  Former Quit Date  1997 Smoking Tobacco Type  Cigarettes quit in 1997      Smokeless Tobacco Use  Never              Alcohol History       Alcohol Use Status  Yes              Drug Use       Drug Use Status  Never              Sexual Activity       Sexually Active  Not Asked                    Family History     Family History   Problem Relation Age of Onset    Lung Cancer Paternal Grandmother     Hypertension Maternal Grandmother     Prostate Cancer Maternal Grandfather     Cancer Father         skin    Cancer Mother         skin    Hypertension Mother     Cancer Brother         skin    Hypertension Brother     Breast Cancer Maternal Aunt        Review of Systems   Review of Systems   Constitutional:  Negative for chills and fever. Genitourinary:  Positive for vaginal pain (bulge).  Negative for difficulty urinating, dysuria, menstrual problem, pelvic pain, vaginal bleeding and vaginal discharge. Physical Exam   /68   Pulse 81   Temp 97.6 °F (36.4 °C) (Temporal)   Resp 18   Ht 5' 5\" (1.651 m)   Wt 160 lb (72.6 kg)   SpO2 100%   BMI 26.63 kg/m²     Physical Exam  Constitutional:       Appearance: Normal appearance. HENT:      Head: Normocephalic and atraumatic. Eyes:      Extraocular Movements: Extraocular movements intact. Pupils: Pupils are equal, round, and reactive to light. Cardiovascular:      Rate and Rhythm: Normal rate. Pulmonary:      Effort: Pulmonary effort is normal.   Musculoskeletal:         General: Normal range of motion. Cervical back: Normal range of motion. Skin:     General: Skin is warm and dry. Neurological:      Mental Status: She is alert and oriented to person, place, and time. Psychiatric:         Mood and Affect: Mood normal.         Behavior: Behavior normal.         Thought Content: Thought content normal.         Judgment: Judgment normal.       Labs    No results found for this or any previous visit (from the past 24 hour(s)). Imaging/Diagnostics Last 24 Hours   No results found.     Assessment      Uterovaginal prolapse    Plan   RA LSH/BSO, RASC, poss A&P    Consultations Ordered:  None    Electronically signed by Mercedez Corado DO on 2/6/23 at 7:17 AM EST

## 2023-02-06 NOTE — ANESTHESIA PRE PROCEDURE
Department of Anesthesiology  Preprocedure Note       Name:  Angelita Reveles   Age:  62 y.o.  :  1965                                          MRN:  [id number]         Date:  2023      Surgeon: Phylliss Cranker):  Hannah Ch DO    Procedure: Procedure(s): HYSTERECTOMY VAGINAL LAPAROSCOPIC ROBOTIC ASSISTED-  SUPRACERVICAL,  BSO  SACRAL COLPOPEXY ROBOTIC-ABDOMINAL  VAGINAL REPAIR ANTERIOR AND POSTERIOR-POSTERIOR ONLY    Medications prior to admission:   Prior to Admission medications    Medication Sig Start Date End Date Taking? Authorizing Provider   Multiple Vitamin (MULTI-VITAMIN DAILY PO) Take by mouth  Patient not taking: No sig reported    Historical Provider, MD   Cholecalciferol (VITAMIN D3) 1.25 MG (96489 UT) CAPS Take by mouth  Patient not taking: No sig reported    Historical Provider, MD       Current medications:    Current Facility-Administered Medications   Medication Dose Route Frequency Provider Last Rate Last Admin    lactated ringers IV soln infusion   IntraVENous Continuous MarshALEX Jorgensen - CRNA 100 mL/hr at 23 0656 New Bag at 23 0656    sodium chloride flush 0.9 % injection 5-40 mL  5-40 mL IntraVENous 2 times per day Jean Carlos Ochoa PA-C        sodium chloride flush 0.9 % injection 5-40 mL  5-40 mL IntraVENous PRN RODO Bojorquez-GURMEET        0.9 % sodium chloride infusion   IntraVENous PRN Jean Carlos Ochoa PA-C        ceFAZolin (ANCEF) 2000 mg in 0.9% sodium chloride 100 mL IVPB  2,000 mg IntraVENous On Call to Brooks Boateng PA-C           Allergies: Allergies   Allergen Reactions    Ciprofloxacin     Macrobid [Nitrofurantoin]        Problem List:  There is no problem list on file for this patient.       Past Medical History:        Diagnosis Date    Hyperlipidemia        Past Surgical History:        Procedure Laterality Date    HERNIA REPAIR         Social History:    Social History     Tobacco Use    Smoking status: Former Types: Cigarettes     Quit date: 0     Years since quittin.1    Smokeless tobacco: Never   Substance Use Topics    Alcohol use: Yes                                Counseling given: Not Answered      Vital Signs (Current):   Vitals:    23 1446 23 0630   BP:  122/68   Pulse:  81   Resp:  18   Temp:  36.4 °C (97.6 °F)   TempSrc:  Temporal   SpO2:  100%   Weight: 158 lb (71.7 kg) 160 lb (72.6 kg)   Height: 5' 5\" (1.651 m) 5' 5\" (1.651 m)                                              BP Readings from Last 3 Encounters:   23 122/68   23 112/74   22 110/70       NPO Status: Time of last liquid consumption:                         Time of last solid consumption:                         Date of last liquid consumption: 23                        Date of last solid food consumption: 23    BMI:   Wt Readings from Last 3 Encounters:   23 160 lb (72.6 kg)   23 163 lb (73.9 kg)   22 156 lb (70.8 kg)     Body mass index is 26.63 kg/m². CBC:   Lab Results   Component Value Date/Time    WBC 5.2 2023 10:10 AM    RBC 4.54 2023 10:10 AM    HGB 13.9 2023 10:10 AM    HCT 43.1 2023 10:10 AM    MCV 94.9 2023 10:10 AM    RDW 13.7 2023 10:10 AM     2023 10:10 AM       CMP:   Lab Results   Component Value Date/Time     2023 10:10 AM    K 4.5 2023 10:10 AM     2023 10:10 AM    CO2 29 2023 10:10 AM    BUN 14 2023 10:10 AM    CREATININE 0.67 2023 10:10 AM    LABGLOM >60 2023 10:10 AM    GLUCOSE 84 2023 10:10 AM    PROT 7.2 2023 10:10 AM    CALCIUM 9.7 2023 10:10 AM    BILITOT 0.3 2023 10:10 AM    ALKPHOS 63 2023 10:10 AM    AST 17 2023 10:10 AM    ALT 14 2023 10:10 AM       POC Tests: No results for input(s): POCGLU, POCNA, POCK, POCCL, POCBUN, POCHEMO, POCHCT in the last 72 hours.     Coags: No results found for: PROTIME, INR, APTT    HCG (If Applicable): No results found for: PREGTESTUR, PREGSERUM, HCG, HCGQUANT     ABGs: No results found for: PHART, PO2ART, XZO6WAZ, XMM5VIN, BEART, L0ORUZRR     Type & Screen (If Applicable):  No results found for: LABABO, LABRH    Drug/Infectious Status (If Applicable):  No results found for: HIV, HEPCAB    COVID-19 Screening (If Applicable): No results found for: COVID19        Anesthesia Evaluation  Patient summary reviewed and Nursing notes reviewed no history of anesthetic complications:   Airway: Mallampati: I  TM distance: >3 FB   Neck ROM: full  Mouth opening: > = 3 FB   Dental: normal exam     Comment: few crowns at molars    Pulmonary:Negative Pulmonary ROS and normal exam                               Cardiovascular:    (+) hyperlipidemia                  Neuro/Psych:   Negative Neuro/Psych ROS              GI/Hepatic/Renal: Neg GI/Hepatic/Renal ROS            Endo/Other: Negative Endo/Other ROS                    Abdominal:             Vascular: negative vascular ROS. Other Findings:           Anesthesia Plan      general     ASA 1             Anesthetic plan and risks discussed with patient and spouse.               Post-op pain plan if not by surgeon: single peripheral nerve block            ALEX Morin CRNA   2/6/2023

## 2023-02-06 NOTE — DISCHARGE INSTRUCTIONS
SAME DAY SURGERY DISCHARGE INSTRUCTIONS    1. Do not drive or operate hazardous machinery for 24 hours. 2.  Do not make important personal or business decisions for 24 hours. 3.  Do not drink alcoholic beverages for 24 hours. 4.  Do not smoke tobacco products for 24 hours. 5.  Patient should not be left alone for 12-24 hours following surgical procedure. 6.  Eat light foods (Jell-O, soups, etc....) and drink plenty of fluids (water, Sprite, etc...) up to 8 glasses per day, as you can tolerate. 7.  If your bandages become soaked with bright red blood, place another dressing pad over your bandages. (DO NOT remove original bandage.)  Call your surgeon for further instructions. A small amount of bright red blood is to be expected. 8.  Wash hands before and after incision care. It is important to practice good personal hygiene during the post op period. 9.  You may remove your dressing the morning following surgery; leave the steri-strips in place, they will fall off on their own. If they have not fallen off in 7-10 days, please remove them. 10.  If no drainage from incisions you may shower. 11.  Limit your activities for 24 hours. Do not engage in heavy work until your surgeon gives you permission. DO NOT lift anything heavier than 10 pounds. You may go up & down stairs and do any activity that can be done comfortably. 12.  Report the following signs or any questions regarding your physical condition to your surgeon immediately:    Excessive swelling of, or around the wound area. Redness or pus-like drainage    Temperature of 100 degrees (F) or above. Excessive pain. If unable to urinate 4 hours after surgery. If bleeding at surgery site continues after 5-10 minutes of pressure. 13.  Pain Control:  Take pain meds as prescribed. You may use over the counter meds like Acetaminophen or Ibuprofen if not part of the meds already prescribed.     While on narcotic pain meds DO NOT drive, operate machinery or make business decisions. 14.  Try to avoid constipation (no bowel movement) by using over the counter Colace once or twice daily and increasing your fluid intake. Please call if no bowel movement after increasing fluid intake, use of Milk of Magnesia, Pericolace (laxative) or Dulcolax suppositories. 15.  No sexual activity, tampons, douches, sitting in hot tubs/saunas or swimming in pools/ponds for 6 weeks or until cleared by your surgeon. 12.  Call your surgeon for any questions regarding your surgery. 17.  Call for an appointment to see RODO Fernandes in 2 weeks.    Dr. Brayden Kearney -- [place] office      [phone]      [facility] office   [phone]

## 2023-02-07 LAB — SURGICAL PATHOLOGY REPORT: NORMAL

## 2023-02-10 PROBLEM — G89.18 POST-OP PAIN: Status: ACTIVE | Noted: 2023-02-10

## 2023-02-16 ENCOUNTER — OFFICE VISIT (OUTPATIENT)
Dept: OBGYN CLINIC | Age: 58
End: 2023-02-16

## 2023-02-16 VITALS — WEIGHT: 164 LBS | DIASTOLIC BLOOD PRESSURE: 66 MMHG | BODY MASS INDEX: 27.29 KG/M2 | SYSTOLIC BLOOD PRESSURE: 108 MMHG

## 2023-02-16 DIAGNOSIS — Z09 POSTOPERATIVE EXAMINATION: Primary | ICD-10-CM

## 2023-02-16 PROCEDURE — 99024 POSTOP FOLLOW-UP VISIT: CPT

## 2023-02-16 NOTE — PROGRESS NOTES
Postop Progress Note    Subjective    Nathaniel Botello presents to the office for postop follow up. Chief Complaint   Patient presents with    Post-Op Check     Patient had supracervical TLH w/BSO on 2-6-2023. Patient had had some spotting on Tuesday and thinks she overdid it shopping. She took it easy yesterday and hasn't seen any further bleeding. Objective    Vitals:    02/16/23 1010   BP: 108/66     General: alert, cooperative and no distress  Incision: healing well    Assessment  Doing well postoperatively. She did have some spotting this week after being up on feet and active all day. Stressed importance of resting and following restrictions. Reports that she has been able to keep bowel movements regular s/p posterior repair. Abdominal incisions healing well. Plan  1. Continue any current medications  2. Wound care discussed  3. Pt is to continue with activity restrictions  4. Usual diet  5.  Follow up: 4 weeks    Electronically signed by Keith Malone PA-C on 2/16/2023 at 10:13 AM

## 2023-03-15 ENCOUNTER — OFFICE VISIT (OUTPATIENT)
Dept: OBGYN CLINIC | Age: 58
End: 2023-03-15

## 2023-03-15 VITALS — WEIGHT: 166 LBS | DIASTOLIC BLOOD PRESSURE: 84 MMHG | SYSTOLIC BLOOD PRESSURE: 124 MMHG | BODY MASS INDEX: 27.62 KG/M2

## 2023-03-15 DIAGNOSIS — Z09 POSTOPERATIVE EXAMINATION: Primary | ICD-10-CM

## 2023-03-15 NOTE — PROGRESS NOTES
Postop Progress Note    Subjective    Maira Colon presents to the office for postop follow up. Chief Complaint   Patient presents with    Post-Op Check     Patient had supracervical hysterectomy w/BSO on 2-6-2023. Patient doing well, reports tenderness to the touch on the right side. She notices some pain/pressure when having a bowel movement. Objective    Vitals:    03/15/23 0945   BP: 124/84     General: alert, cooperative and no distress  Incision: healing well; apex well supported; posterior wall healing but suture still visible    Assessment  Doing well postoperatively. Plan  1. Continue any current medications  2. Wound care discussed  3. Pt is to increase activities as tolerated after 8 weeks to allow suture to heal  4. Usual diet  5.  Follow up: annual    Electronically signed by Swapnil Alcazar DO on 3/15/2023 at 10:03 AM

## 2025-02-11 LAB — MAMMOGRAPHY, EXTERNAL: NORMAL

## 2025-04-24 ENCOUNTER — OFFICE VISIT (OUTPATIENT)
Dept: OBGYN CLINIC | Age: 60
End: 2025-04-24
Payer: COMMERCIAL

## 2025-04-24 ENCOUNTER — HOSPITAL ENCOUNTER (OUTPATIENT)
Age: 60
Setting detail: SPECIMEN
Discharge: HOME | End: 2025-04-24

## 2025-04-24 VITALS
BODY MASS INDEX: 26.46 KG/M2 | WEIGHT: 155 LBS | SYSTOLIC BLOOD PRESSURE: 130 MMHG | HEIGHT: 64 IN | DIASTOLIC BLOOD PRESSURE: 66 MMHG

## 2025-04-24 DIAGNOSIS — Z01.419 VISIT FOR GYNECOLOGIC EXAMINATION: Primary | ICD-10-CM

## 2025-04-24 DIAGNOSIS — N81.6 RECTOCELE: ICD-10-CM

## 2025-04-24 DIAGNOSIS — R19.05 PERIUMBILICAL MASS: ICD-10-CM

## 2025-04-24 PROCEDURE — 99213 OFFICE O/P EST LOW 20 MIN: CPT

## 2025-04-24 PROCEDURE — 99459 PELVIC EXAMINATION: CPT

## 2025-04-24 PROCEDURE — 99396 PREV VISIT EST AGE 40-64: CPT

## 2025-04-24 SDOH — ECONOMIC STABILITY: FOOD INSECURITY: WITHIN THE PAST 12 MONTHS, YOU WORRIED THAT YOUR FOOD WOULD RUN OUT BEFORE YOU GOT MONEY TO BUY MORE.: NEVER TRUE

## 2025-04-24 SDOH — ECONOMIC STABILITY: FOOD INSECURITY: WITHIN THE PAST 12 MONTHS, THE FOOD YOU BOUGHT JUST DIDN'T LAST AND YOU DIDN'T HAVE MONEY TO GET MORE.: NEVER TRUE

## 2025-04-24 ASSESSMENT — ENCOUNTER SYMPTOMS
SHORTNESS OF BREATH: 0
ABDOMINAL PAIN: 0
CONSTIPATION: 0
DIARRHEA: 0

## 2025-04-24 NOTE — PROGRESS NOTES
YEARLY PHYSICAL    Date of service: 2025    Libertad Escalante  Is a 59 y.o. female    PT's PCP is: No primary care provider on file.     : 1965                                         Chaperone for Intimate Exam  Chaperone was offered and accepted as part of the rooming process.  Chaperone: [email or web address]. student      Subjective:       No LMP recorded. Patient has had a hysterectomy.     Are your menses regular: not applicable    OB History    Para Term  AB Living   1 1 1   1   SAB IAB Ectopic Molar Multiple Live Births        1      # Outcome Date GA Lbr [place]/2nd Weight Sex Type Anes PTL Lv   1 Term 98    M Vag-Forceps EPI  ASAEL        Social History     Tobacco Use   Smoking Status Former    Current packs/day: 0.00    Average packs/day: 0.5 packs/day for 16.0 years (8.0 ttl pk-yrs)    Types: Cigarettes    Start date: 1981    Quit date:     Years since quittin.3   Smokeless Tobacco Never        Social History     Substance and Sexual Activity   Alcohol Use Yes    Alcohol/week: 4.0 standard drinks of alcohol    Types: 4 Glasses of wine per week       Family History   Problem Relation Age of Onset    Breast Cancer Mother     Cancer Mother         skin    Hypertension Mother     Cancer Father         skin    Cancer Brother         skin    Hypertension Brother     Hypertension Maternal Grandmother     Prostate Cancer Maternal Grandfather     Lung Cancer Paternal Grandmother     Breast Cancer Maternal Aunt        Any family history of breast or ovarian cancer: Yes    Any family history of blood clots: No      Allergies: Ciprofloxacin and Macrobid [nitrofurantoin]      Current Outpatient Medications:     Probiotic Product (PROBIOTIC PO), Take by mouth, Disp: , Rfl:     Multiple Vitamin (MULTI-VITAMIN DAILY PO), Take by mouth, Disp: , Rfl:     Cholecalciferol (VITAMIN D3) 1.25 MG (51097 UT) CAPS, Take by mouth, Disp: ,

## 2025-04-26 ENCOUNTER — RESULTS FOLLOW-UP (OUTPATIENT)
Dept: OBGYN CLINIC | Age: 60
End: 2025-04-26

## 2025-04-26 LAB
HPV I/H RISK 4 DNA CVX QL NAA+PROBE: NOT DETECTED
HPV SAMPLE: NORMAL
HPV, INTERPRETATION: NORMAL
HPV16 DNA CVX QL NAA+PROBE: NOT DETECTED
HPV18 DNA CVX QL NAA+PROBE: NOT DETECTED
SPECIMEN DESCRIPTION: NORMAL

## 2025-05-06 LAB — CYTOLOGY REPORT: NORMAL

## (undated) DEVICE — DRESSING SURESITE-123PLUSPAD 2.4 IN X2.8 IN

## (undated) DEVICE — DRAPE SURG LITH HYSTSCP D AND C STD N FEN N REINF W FLD PCH

## (undated) DEVICE — PAD,SANITARY,11 IN,MAXI,N-STRL,IND WRAP: Brand: MEDLINE

## (undated) DEVICE — 40586 ADVANCED TRENDELENBURG POSITIONING KIT: Brand: 40586 ADVANCED TRENDELENBURG POSITIONING KIT

## (undated) DEVICE — SUTURE DEV SZ 2-0 WND CLSR ABSRB GS-22 VLOC COVIDIEN VLOCM2145

## (undated) DEVICE — Device: Brand: UTERINE ELEVATOR PRO

## (undated) DEVICE — SOLUTION IV 1000ML 0.9% SOD CHL PH 5 INJ USP VIAFLX PLAS

## (undated) DEVICE — UNDERPANTS INCONT XL 45-70IN KNIT SEAMLESS DSGN COLOR-CODED

## (undated) DEVICE — NDLCTR: FOAM/MAG 40CT 64/CS: Brand: MEDICAL ACTION INDUSTRIES

## (undated) DEVICE — COVER LT HNDL BLU PLAS

## (undated) DEVICE — COVER,MAYO STAND,STERILE: Brand: MEDLINE

## (undated) DEVICE — WARMER SCP 2 ANTIFOG LAP DISP

## (undated) DEVICE — TUBING, SUCTION, 9/32" X 12', STRAIGHT: Brand: MEDLINE INDUSTRIES, INC.

## (undated) DEVICE — SUTURE VCRL SZ 2-0 L27IN ABSRB VLT L26MM UR-6 5/8 CIR J602H

## (undated) DEVICE — GOWN,SIRUS,POLYRNF,BRTHSLV,XL,30/CS: Brand: MEDLINE

## (undated) DEVICE — APPLICATOR MEDICATED 26 CC SOLUTION HI LT ORNG CHLORAPREP

## (undated) DEVICE — PREP PAD BNS: Brand: CONVERTORS

## (undated) DEVICE — DRAPE,UTILTY,TAPE,15X26, 4EA/PK: Brand: MEDLINE

## (undated) DEVICE — TIP COVER ACCESSORY

## (undated) DEVICE — GOWN,SIRUS,POLYRNF,BRTHSLV,LG,30/CS: Brand: MEDLINE

## (undated) DEVICE — SUTURE SZ 0 27IN 5/8 CIR UR-6  TAPER PT VIOLET ABSRB VICRYL J603H

## (undated) DEVICE — SUTURE VCRL + SZ 0 L18IN ABSRB UD L36MM CT-1 1/2 CIR VCP840D

## (undated) DEVICE — PACK,LITHOTOMY: Brand: MEDLINE

## (undated) DEVICE — INSUFFLATION NEEDLE TO ESTABLISH PNEUMOPERITONEUM.: Brand: INSUFFLATION NEEDLE

## (undated) DEVICE — TOWEL,OR,DSP,ST,BLUE,STD,4/PK,20PK/CS: Brand: MEDLINE

## (undated) DEVICE — PREMIUM DRY TRAY LF: Brand: MEDLINE INDUSTRIES, INC.

## (undated) DEVICE — SOLUTION IV 50ML 0.9% SOD CHL

## (undated) DEVICE — LAVH-LF: Brand: MEDLINE INDUSTRIES, INC.

## (undated) DEVICE — DISSECTOR LAP DIA5MM BLNT TIP ENDOPATH

## (undated) DEVICE — SPONGE LAP W18XL18IN WHT COT 4 PLY FLD STRUNG RADPQ DISP ST

## (undated) DEVICE — IV BAG ACCESS SPIKE, CLAVE®, CHECK VALVE: Brand: ICU MEDICAL

## (undated) DEVICE — ARM DRAPE

## (undated) DEVICE — BLADELESS OBTURATOR: Brand: WECK VISTA

## (undated) DEVICE — SOLUTION IV 1000ML 0.9% SOD CHL FOR IRRIG PLAS CONT

## (undated) DEVICE — SPONGE,PEANUT,XRAY,ST,SM,3/8",5/CARD: Brand: MEDLINE INDUSTRIES, INC.

## (undated) DEVICE — SUTURE VCRL SZ 2-0 L27IN ABSRB VLT L26MM SH 1/2 CIR J317H

## (undated) DEVICE — SUTURE VCRL + SZ 3-0 L27IN ABSRB UD L26MM SH 1/2 CIR VCP416H

## (undated) DEVICE — TOTAL TRAY, DB, 100% SILI FOLEY, 16FR 10: Brand: MEDLINE

## (undated) DEVICE — ELECTRODE ES AD CRD L15FT DISP FOR PT BELOW 30LB REM

## (undated) DEVICE — YANKAUER,FLEXIBLE HANDLE,REGLR CAPACITY: Brand: MEDLINE INDUSTRIES, INC.

## (undated) DEVICE — MARKER,SKIN,WI/RULER AND LABELS: Brand: MEDLINE

## (undated) DEVICE — DRESSING HEMSTAT W1X2IN ABSRB SURGCEL SNOW

## (undated) DEVICE — SOLUTION IV IRRIG POUR BRL 0.9% SODIUM CHL 2F7124

## (undated) DEVICE — INTENDED FOR TISSUE SEPARATION, AND OTHER PROCEDURES THAT REQUIRE A SHARP SURGICAL BLADE TO PUNCTURE OR CUT.: Brand: BARD-PARKER ® STAINLESS STEEL BLADES

## (undated) DEVICE — ELECTRO LUBE IS A SINGLE PATIENT USE DEVICE THAT IS INTENDED TO BE USED ON ELECTROSURGICAL ELECTRODES TO REDUCE STICKING.: Brand: KEY SURGICAL ELECTRO LUBE

## (undated) DEVICE — CANNULA SEAL

## (undated) DEVICE — SUTURE V-LOC 90 3-0 L9IN ABSRB VLT L26MM V-20 1/2 CIR TAPR VLOCM0644

## (undated) DEVICE — [HIGH FLOW INSUFFLATOR,  DO NOT USE IF PACKAGE IS DAMAGED,  KEEP DRY,  KEEP AWAY FROM SUNLIGHT,  PROTECT FROM HEAT AND RADIOACTIVE SOURCES.]: Brand: PNEUMOSURE

## (undated) DEVICE — MASTISOL ADHESIVE LIQ 2/3ML

## (undated) DEVICE — SUTURE MCRYL SZ 4-0 L27IN ABSRB UD L19MM PS-2 1/2 CIR PRIM Y426H

## (undated) DEVICE — 20 ML SYRINGE LUER-LOCK TIP: Brand: MONOJECT

## (undated) DEVICE — Device

## (undated) DEVICE — SOLUTION SURG PREP ANTIMICROBIAL 4 OZ SKIN WND EXIDINE

## (undated) DEVICE — STRIP,CLOSURE,WOUND,MEDI-STRIP,1/2X4: Brand: MEDLINE

## (undated) DEVICE — ANCHOR TISSUE RETRIEVAL SYSTEM, BAG SIZE 125 ML, PORT SIZE 8 MM: Brand: ANCHOR TISSUE RETRIEVAL SYSTEM

## (undated) DEVICE — GAUZE,SPONGE,4"X4",16PLY,XRAY,STRL,LF: Brand: MEDLINE

## (undated) DEVICE — SUTURE ETHBND EXCEL SZ 2-0 L30IN NONABSORBABLE GRN L26MM SH X833H

## (undated) DEVICE — MATERNITY KNIT PANTS,SEAMLESS: Brand: WINGS

## (undated) DEVICE — NEEDLE SPNL 20GA L3.5IN YEL HUB S STL REG WALL FIT STYL W/

## (undated) DEVICE — GLOVE SURG SZ 65 L12IN FNGR THK87MIL WHT LTX FREE

## (undated) DEVICE — SYRINGE MED 10ML LUERLOCK TIP W/O SFTY DISP

## (undated) DEVICE — Z DISCONTINUED APPLICATOR SURG PREP 0.35OZ 2% CHG 70% ISO ALC W/ HI LT